# Patient Record
Sex: FEMALE | Race: BLACK OR AFRICAN AMERICAN | URBAN - METROPOLITAN AREA
[De-identification: names, ages, dates, MRNs, and addresses within clinical notes are randomized per-mention and may not be internally consistent; named-entity substitution may affect disease eponyms.]

---

## 2018-08-14 ENCOUNTER — TELEPHONE (OUTPATIENT)
Dept: NEUROLOGY | Age: 53
End: 2018-08-14

## 2018-08-14 ENCOUNTER — OFFICE VISIT (OUTPATIENT)
Dept: NEUROLOGY | Age: 53
End: 2018-08-14

## 2018-08-14 VITALS — DIASTOLIC BLOOD PRESSURE: 100 MMHG | WEIGHT: 192 LBS | SYSTOLIC BLOOD PRESSURE: 164 MMHG

## 2018-08-14 DIAGNOSIS — G89.29 CHRONIC INTRACTABLE HEADACHE, UNSPECIFIED HEADACHE TYPE: Primary | ICD-10-CM

## 2018-08-14 DIAGNOSIS — R51.9 CHRONIC INTRACTABLE HEADACHE, UNSPECIFIED HEADACHE TYPE: Primary | ICD-10-CM

## 2018-08-14 RX ORDER — VERAPAMIL HYDROCHLORIDE 120 MG/1
120 TABLET, FILM COATED, EXTENDED RELEASE ORAL
Qty: 30 TAB | Refills: 5 | Status: SHIPPED | OUTPATIENT
Start: 2018-08-14

## 2018-08-14 RX ORDER — TRAZODONE HYDROCHLORIDE 50 MG/1
TABLET ORAL
COMMUNITY

## 2018-08-14 RX ORDER — BUPROPION HYDROCHLORIDE 300 MG/1
300 TABLET ORAL
COMMUNITY

## 2018-08-14 RX ORDER — LISINOPRIL 10 MG/1
TABLET ORAL DAILY
COMMUNITY

## 2018-08-14 RX ORDER — PAROXETINE HYDROCHLORIDE 20 MG/1
TABLET, FILM COATED ORAL DAILY
COMMUNITY

## 2018-08-14 NOTE — MR AVS SNAPSHOT
[address] [place] [address] [address] [facility] [id number] [phone] Patient: Мария Vallecillo MRN: [id number] :1965 Visit Information Date & Time Provider Department Dept. Phone Encounter #  
 2018  8:00 AM Paresh Arriaga MD [facility] [phone] [id number] Follow-up Instructions Return in about 1 month (around 2018). Upcoming Health Maintenance Date Due DTaP/Tdap/Td series (1 - Tdap) 10/27/1988 PAP AKA CERVICAL CYTOLOGY 10/27/1988 BREAST CANCER SCRN MAMMOGRAM 10/27/2017 FOBT Q 1 YEAR AGE 50-75 10/27/2017 Influenza Age 5 to Adult 2018 Allergies as of 2018  Review Complete On: 2018 By: Tito Jones LPN No Known Allergies Current Immunizations  Never Reviewed No immunizations on file. Not reviewed this visit You Were Diagnosed With   
  
 Codes Comments Chronic intractable headache, unspecified headache type    -  Primary ICD-10-CM: R51 ICD-9-CM: 548. 0 Vitals BP Weight(growth percentile) Smoking Status (!) 100/100 192 lb (87.1 kg) Never Smoker Preferred Pharmacy Pharmacy Name Phone [facility] [address] [facility], [address] [place] [address],[facility] Floor [phone] Your Updated Medication List  
  
   
This list is accurate as of 18  8:49 AM.  Always use your most recent med list.  
  
  
  
  
 buPROPion  mg XL tablet Commonly known as:  Kathlean Woods Take 300 mg by mouth every morning. lisinopril 10 mg tablet Commonly known as:  Dee Paul Take  by mouth daily. PARoxetine 20 mg tablet Commonly known as:  PAXIL Take  by mouth daily. traZODone 50 mg tablet Commonly known as:  Efe Bump Take  by mouth nightly. verapamil  mg tablet Commonly known as:  CALAN-SR Take 1 Tab by mouth nightly. Prescriptions Sent to Pharmacy Refills  
 verapamil ER (CALAN-SR) 120 mg tablet 5 Sig: Take 1 Tab by mouth nightly. Class: Normal  
 Pharmacy: [address] [facility], [address] [facility]  #: [phone] Route: Oral  
  
Follow-up Instructions Return in about 1 month (around 9/14/2018). Patient Instructions PRESCRIPTION REFILL POLICY [facility] Statement to Patients April 1, 2014 In an effort to ensure the large volume of patient prescription refills is processed in the most efficient and expeditious manner, we are asking our patients to assist us by calling your Pharmacy for all prescription refills, this will include also your  Mail Order Pharmacy. The pharmacy will contact our office electronically to continue the refill process. Please do not wait until the last minute to call your pharmacy. We need at least 48 hours (2days) to fill prescriptions. We also encourage you to call your pharmacy before going to  your prescription to make sure it is ready. With regard to controlled substance prescription refill requests (narcotic refills) that need to be picked up at our office, we ask your cooperation by providing us with at least 72 hours (3days) notice that you will need a refill. We will not refill narcotic prescription refill requests after 4:00pm on any weekday, Monday through Thursday, or after 2:00pm on Fridays, or on the weekends. We encourage everyone to explore another way of getting your prescription refill request processed using [employer], our patient web portal through our electronic medical record system. [employer] is an efficient and effective way to communicate your medication request directly to the office and  downloadable as an zack on your smart phone .  [employer] also features a review functionality that allows you to view your medication list as well as leave messages for your physician. Are you ready to get connected? If so please review the attatched instructions or speak to any of our staff to get you set up right away! Thank you so much for your cooperation. Should you have any questions please contact our Practice Administrator. The Physicians and Staff,  [facility] Introducing [facility] SERVICES! [facility] introduces [employer] patient portal. Now you can access parts of your medical record, email your doctor's office, and request medication refills online. 1. In your internet browser, go to [email or web address] 2. Click on the First Time User? Click Here link in the Sign In box. You will see the New Member Sign Up page. 3. Enter your [employer] Access Code exactly as it appears below. You will not need to use this code after youve completed the sign-up process. If you do not sign up before the expiration date, you must request a new code. · [employer] Access Code: [email or web address] Expires: 11/12/2018  7:37 AM 
 
4. Enter the last four digits of your Social Security Number (xxxx) and Date of Birth (mm/dd/yyyy) as indicated and click Submit. You will be taken to the next sign-up page. 5. Create a [employer] ID. This will be your [employer] login ID and cannot be changed, so think of one that is secure and easy to remember. 6. Create a [employer] password. You can change your password at any time. 7. Enter your Password Reset Question and Answer. This can be used at a later time if you forget your password. 8. Enter your e-mail address. You will receive e-mail notification when new information is available in [address] 9. Click Sign Up. You can now view and download portions of your medical record. 10. Click the Download Summary menu link to download a portable copy of your medical information.  
 
If you have questions, please visit the Frequently Asked Questions section of the [employer]. Remember, [employer] is NOT to be used for urgent needs. For medical emergencies, dial 911. Now available from your iPhone and Android! Please provide this summary of care documentation to your next provider. Your primary care clinician is listed as Nela Salinas. If you have any questions after today's visit, please call [phone].

## 2018-08-14 NOTE — TELEPHONE ENCOUNTER
----- Message from Blaine Isaacs sent at 8/14/2018  1:08 PM EDT -----  Regarding: Dr. Chiang Degree  Pt stated, she pickup BP medication prescribed today at appt. Pt inquiring should she still take other BP medication prescribed by PCP.   Best contact number [phone]

## 2018-08-14 NOTE — PROGRESS NOTES
NEUROLOGY NEW PATIENT OFFICE CONSULTATION      8/14/2018    RE: Bowen Sweeney         1965      REFERRED BY:  Oh Pierce NP        CHIEF COMPLAINT:  This is Bowen Sweeney is a 50 y.o. female right handed  who had concerns including Headache. HPI:     SInce her 44's patient has been having headaches, bitemporal and bifrontal, pressure, 9/10, occurring 3/ week, tends to happen at night laying down, stress seem to trigger it (+) nausea (-) vomiting (+) photophobia (-) phonophobia (+) blurred vision. Since 2015, patient being seen by endocrinologist Muriel Polanco MD for hyperprolactinemia. She was briefly on cabergoline from October 16 at 1216 but stopped due to headaches. Also tried Bromocriptine (caused headache). Now that she is off meds, feels breasts are full. Had recent sleep study. Reviewed medical records from outside:    MRI of the pituitary gland care of Jozef Soares done June 4, 2018 showed the sella is enlarged with flattening of the pituitary gland along the floor. The appearance is consistent with partially empty sella. There is a small focal nodule within the pituitary gland with delayed enhancement consistent with microadenoma. This measures 4 mm transverse by 2.5 mm AP with a height of 1.1 mm.   Size and appearance are unchanged from previous exam.    Prolactin 101.5 H        ROS   (-) fever  (-) rash  All other systems reviewed and are negative    Past Medical Hx  Past Medical History:   Diagnosis Date    Headache     Hypertension       HTN  Anxiety    Social Hx  Social History     Social History    Marital status:      Spouse name: N/A    Number of children: N/A    Years of education: N/A     Social History Main Topics    Smoking status: Never Smoker    Smokeless tobacco: Never Used    Alcohol use Yes    Drug use: None    Sexual activity: Not Asked     Other Topics Concern    None     Social History Narrative    None       Family Hx  History reviewed. No pertinent family history. Adopted    ALLERGIES  No Known Allergies    CURRENT MEDS          PREVIOUS WORKUP: (reviewed)  IMAGING:    CT Results (recent):  No results found for this or any previous visit. MRI Results (recent):  No results found for this or any previous visit. IR Results (recent):  No results found for this or any previous visit. VAS/US Results (recent):  No results found for this or any previous visit. LABS (reviewed)  No results found for this or any previous visit. Physical Exam:     Visit Vitals    BP (!) 164/100    Wt 87.1 kg (192 lb)     General:  Alert, cooperative, no distress. Head:  Normocephalic, without obvious abnormality, atraumatic. Eyes:  Conjunctivae/corneas clear. Lungs:  Heart:   Non labored breathing  Regular rate and rhythm, no carotid bruits   Abdomen:   Soft, non-distended   Extremities: Extremities normal, atraumatic, no cyanosis or edema. Pulses: 2+ and symmetric all extremities. Skin: Skin color, texture, turgor normal. No rashes or lesions.   Neurologic Exam     Gen: Attention normal             Language: naming, repetition, fluency normal             Memory: intact recent and remote memory  Cranial Nerves:  I: smell Not tested   II: visual fields Full to confrontation   II: pupils Equal, round, reactive to light   II: optic disc No papilledema   III,VII: ptosis none   III,IV,VI: extraocular muscles  Full ROM   V: mastication normal   V: facial light touch sensation  normal   VII: facial muscle function   symmetric   VIII: hearing symmetric   IX: soft palate elevation  normal   XI: trapezius strength  5/5   XI: sternocleidomastoid strength 5/5   XI: neck flexion strength  5/5   XII: tongue  midline     Motor: normal bulk and tone, no tremor              Strength: 5/5 all four extremities  Sensory: intact to LT, PP, vibration, and JPS  Reflexes: 2+ throughout; Down going toes  Coordination: Good FTN and HTS  Gait: normal gait including tandem            Impression:     Dave Peraza is a 50 y.o. female who  has a past medical history of Headache and Hypertension. who sInce her 44's has been having headaches, bitemporal and bifrontal, pressure, 9/10, occurring 3/ week, tends to happen at night laying down, stress seem to trigger it (+) nausea (-) vomiting (+) photophobia (-) phonophobia (+) blurred vision. Consideration includes headache due to uncontrolled HTN (supine headache) or migraine, with visual auras, intractable. Patient also since 2015, is being seen by endocrinologist Kelly Hopson MD for hyperprolactinemia due to a 4 mm x 2.5 mm pituitary microadenoma. She was briefly on cabergoline from October 16 at 1216 but stopped due to headaches. Also tried Bromocriptine (caused headache). Now that she is off meds, feels breasts are full. Her lack of sleep may be playing a role with her headaches. RECOMMENDATIONS  1. I had a long discussion with patient. Discussed diagnosis, prognosis, pathophysiology and available treatment. Reviewed test results. All questions were answered. 2. Trial of Calan  mg QHS to treat HTN and also a migraine prophylaxis  3. Trial of Melatonin for insomnia  4. Patient reminded to see Dr Cyrus Cornell to discuss surgical option regarding prolactinoma  5. Patient recently had portable sleep study, pending results  6. Advise to monitor BP standing and laying down  7. Reviewed medical records from outside    I spent total of 80 mins with the patient, greater than 50% of the visit was spent on providing counseling and coordination of care. Follow-up Disposition:  Return in about 1 month (around 9/14/2018). Thank you for the consultation      Marcela Recinos MD  Diplomate, American Board of Psychiatry and Neurology  Diplomate, Neuromuscular Medicine  Diplomate, American Board of Electrodiagnostic Medicine        CC:  Kerrie Crawley NP  Fax: [phone]

## 2018-08-14 NOTE — LETTER
8/14/2018 9:05 AM 
 
Patient:  Hannah Brown YOB: 1965 Date of Visit: 8/14/2018 Dear Nicole Rueda NP 
[address] [address] VIA Facsimile: [phone] Riley Officer, MD 
[address] [facility] [place] [facility] [address] [address] VIA Facsimile: [phone] 
 : Thank you for referring Ms. Hannah Brown to me for evaluation/treatment. Below are the relevant portions of my assessment and plan of care. If you have questions, please do not hesitate to call me. I look forward to following Ms. Miller along with you. Sincerely, Sandra Whitehead MD

## 2018-08-14 NOTE — PATIENT INSTRUCTIONS
[facility]   Statement to Patients  April 1, 2014      In an effort to ensure the large volume of patient prescription refills is processed in the most efficient and expeditious manner, we are asking our patients to assist us by calling your Pharmacy for all prescription refills, this will include also your  Mail Order Pharmacy. The pharmacy will contact our office electronically to continue the refill process. Please do not wait until the last minute to call your pharmacy. We need at least 48 hours (2days) to fill prescriptions. We also encourage you to call your pharmacy before going to  your prescription to make sure it is ready. With regard to controlled substance prescription refill requests (narcotic refills) that need to be picked up at our office, we ask your cooperation by providing us with at least 72 hours (3days) notice that you will need a refill. We will not refill narcotic prescription refill requests after 4:00pm on any weekday, Monday through Thursday, or after 2:00pm on Fridays, or on the weekends. We encourage everyone to explore another way of getting your prescription refill request processed using [email or web address], our patient web portal through our electronic medical record system. [email or web address] is an efficient and effective way to communicate your medication request directly to the office and  downloadable as an zack on your smart phone . [email or web address] also features a review functionality that allows you to view your medication list as well as leave messages for your physician. Are you ready to get connected? If so please review the attatched instructions or speak to any of our staff to get you set up right away! Thank you so much for your cooperation. Should you have any questions please contact our Practice Administrator.     The Physicians and Staff,  [facility]

## 2018-08-15 ENCOUNTER — TELEPHONE (OUTPATIENT)
Dept: NEUROLOGY | Age: 53
End: 2018-08-15

## 2018-08-15 NOTE — TELEPHONE ENCOUNTER
TC- notified patient she can take Verapamil with Lisinopril. But needs to monitor BP and if it becomes too low (less than 90/60) then she should stop the Lisinopril. Patient verbalized understanding.

## 2018-08-15 NOTE — TELEPHONE ENCOUNTER
----- Message from Jesu Walker sent at 8/15/2018 11:25 AM EDT -----  Regarding: Barbara/telephone  Pt stated she has two different BP medications and she is not sure which one she should take or should she take both. Pts number is [phone].

## 2018-10-01 ENCOUNTER — TELEPHONE (OUTPATIENT)
Dept: NEUROLOGY | Age: 53
End: 2018-10-01

## 2018-10-01 NOTE — TELEPHONE ENCOUNTER
----- Message from [facility] sent at 10/1/2018 11:22 AM EDT -----  Regarding: Dr. Artemio Gardner: [phone]  Pt is requesting the contact information for the neurosurgeon she saw so that she can get her results?

## 2024-02-01 PROBLEM — D35.2 BENIGN NEOPLASM OF PITUITARY GLAND (HCC): Status: ACTIVE | Noted: 2022-08-31

## 2024-02-01 PROBLEM — I10 ESSENTIAL HYPERTENSION: Status: ACTIVE | Noted: 2022-08-31

## 2024-02-01 PROBLEM — N39.3 FEMALE STRESS INCONTINENCE: Status: ACTIVE | Noted: 2023-09-22

## 2024-02-01 PROBLEM — A59.01 TRICHOMONAL VAGINITIS: Status: ACTIVE | Noted: 2023-10-10

## 2024-02-20 ENCOUNTER — TRANSCRIBE ORDERS (OUTPATIENT)
Facility: HOSPITAL | Age: 59
End: 2024-02-20

## 2024-02-20 DIAGNOSIS — M25.561 RIGHT KNEE PAIN, UNSPECIFIED CHRONICITY: Primary | ICD-10-CM

## 2024-02-20 DIAGNOSIS — M25.562 LEFT KNEE PAIN, UNSPECIFIED CHRONICITY: ICD-10-CM

## 2024-04-08 ENCOUNTER — TRANSCRIBE ORDERS (OUTPATIENT)
Facility: HOSPITAL | Age: 59
End: 2024-04-08

## 2024-04-08 ENCOUNTER — HOSPITAL ENCOUNTER (OUTPATIENT)
Facility: HOSPITAL | Age: 59
Discharge: HOME | End: 2024-04-11
Payer: COMMERCIAL

## 2024-04-08 DIAGNOSIS — M17.12 OSTEOARTHRITIS OF LEFT KNEE, UNSPECIFIED OSTEOARTHRITIS TYPE: Primary | ICD-10-CM

## 2024-04-08 DIAGNOSIS — M17.12 OSTEOARTHRITIS OF LEFT KNEE, UNSPECIFIED OSTEOARTHRITIS TYPE: ICD-10-CM

## 2024-04-08 LAB
ALBUMIN SERPL-MCNC: 3.8 G/DL (ref 3.4–5)
ALBUMIN/GLOB SERPL: 1.1 (ref 0.8–1.7)
ALP SERPL-CCNC: 70 U/L (ref 45–117)
ALT SERPL-CCNC: 33 U/L (ref 13–56)
ANION GAP SERPL CALC-SCNC: 4 MMOL/L (ref 3–18)
APPEARANCE UR: CLEAR
AST SERPL-CCNC: 56 U/L (ref 10–38)
BILIRUB SERPL-MCNC: 0.4 MG/DL (ref 0.2–1)
BILIRUB UR QL: NEGATIVE
BUN SERPL-MCNC: 11 MG/DL (ref 7–18)
BUN/CREAT SERPL: 15 (ref 12–20)
CALCIUM SERPL-MCNC: 9 MG/DL (ref 8.5–10.1)
CHLORIDE SERPL-SCNC: 106 MMOL/L (ref 100–111)
CO2 SERPL-SCNC: 31 MMOL/L (ref 21–32)
COLOR UR: YELLOW
CREAT SERPL-MCNC: 0.73 MG/DL (ref 0.6–1.3)
ERYTHROCYTE [DISTWIDTH] IN BLOOD BY AUTOMATED COUNT: 13.6 % (ref 11.6–14.5)
GLOBULIN SER CALC-MCNC: 3.4 G/DL (ref 2–4)
GLUCOSE SERPL-MCNC: 98 MG/DL (ref 74–99)
GLUCOSE UR STRIP.AUTO-MCNC: NEGATIVE MG/DL
HCT VFR BLD AUTO: 39 % (ref 35–45)
HGB BLD-MCNC: 12.3 G/DL (ref 12–16)
HGB UR QL STRIP: NEGATIVE
KETONES UR QL STRIP.AUTO: ABNORMAL MG/DL
LEUKOCYTE ESTERASE UR QL STRIP.AUTO: NEGATIVE
MCH RBC QN AUTO: 28.9 PG (ref 24–34)
MCHC RBC AUTO-ENTMCNC: 31.5 G/DL (ref 31–37)
MCV RBC AUTO: 91.5 FL (ref 78–100)
NITRITE UR QL STRIP.AUTO: NEGATIVE
NRBC # BLD: 0 K/UL (ref 0–0.01)
NRBC BLD-RTO: 0 PER 100 WBC
PH UR STRIP: 6 (ref 5–8)
PLATELET # BLD AUTO: 295 K/UL (ref 135–420)
PMV BLD AUTO: 8.9 FL (ref 9.2–11.8)
POTASSIUM SERPL-SCNC: 3.6 MMOL/L (ref 3.5–5.5)
PROT SERPL-MCNC: 7.2 G/DL (ref 6.4–8.2)
PROT UR STRIP-MCNC: NEGATIVE MG/DL
RBC # BLD AUTO: 4.26 M/UL (ref 4.2–5.3)
SODIUM SERPL-SCNC: 141 MMOL/L (ref 136–145)
SP GR UR REFRACTOMETRY: 1.02 (ref 1–1.03)
UROBILINOGEN UR QL STRIP.AUTO: 0.2 EU/DL (ref 0.2–1)
WBC # BLD AUTO: 5.8 K/UL (ref 4.6–13.2)

## 2024-04-08 PROCEDURE — 36415 COLL VENOUS BLD VENIPUNCTURE: CPT

## 2024-04-08 PROCEDURE — 93005 ELECTROCARDIOGRAM TRACING: CPT

## 2024-04-08 PROCEDURE — 87086 URINE CULTURE/COLONY COUNT: CPT

## 2024-04-08 PROCEDURE — 80053 COMPREHEN METABOLIC PANEL: CPT

## 2024-04-08 PROCEDURE — 85027 COMPLETE CBC AUTOMATED: CPT

## 2024-04-08 PROCEDURE — 81003 URINALYSIS AUTO W/O SCOPE: CPT

## 2024-04-09 LAB
BACTERIA SPEC CULT: NORMAL
EKG ATRIAL RATE: 69 BPM
EKG DIAGNOSIS: NORMAL
EKG P AXIS: 66 DEGREES
EKG P-R INTERVAL: 170 MS
EKG Q-T INTERVAL: 392 MS
EKG QRS DURATION: 74 MS
EKG QTC CALCULATION (BAZETT): 420 MS
EKG R AXIS: 64 DEGREES
EKG T AXIS: 25 DEGREES
EKG VENTRICULAR RATE: 69 BPM
SERVICE CMNT-IMP: NORMAL
SERVICE CMNT-IMP: NORMAL